# Patient Record
Sex: MALE | Race: WHITE | Employment: FULL TIME | URBAN - METROPOLITAN AREA
[De-identification: names, ages, dates, MRNs, and addresses within clinical notes are randomized per-mention and may not be internally consistent; named-entity substitution may affect disease eponyms.]

---

## 2022-03-01 RX ORDER — M-VIT,TX,IRON,MINS/CALC/FOLIC 27MG-0.4MG
1 TABLET ORAL DAILY
COMMUNITY

## 2022-03-01 RX ORDER — CITALOPRAM 20 MG/1
20 TABLET ORAL DAILY
COMMUNITY
End: 2022-08-12

## 2022-03-01 RX ORDER — OMEPRAZOLE 20 MG/1
40 CAPSULE, DELAYED RELEASE ORAL DAILY
COMMUNITY

## 2022-03-01 RX ORDER — LANOLIN ALCOHOL/MO/W.PET/CERES
400 CREAM (GRAM) TOPICAL DAILY
COMMUNITY

## 2022-03-01 NOTE — PROGRESS NOTES
Obstructive Sleep Apnea (FRANCIS) Screening     Patient:  Randy Bearden    YOB: 1995      Medical Record #:  [id number]                     Date:  3/1/2022     1. Are you a loud and/or regular snorer? []  Yes       [x] No    2. Have you been observed to gasp or stop breathing during sleep? []  Yes       [x] No    3. Do you feel tired or groggy upon awakening or do you awaken with a headache?           []  Yes       [x] No    4. Are you often tired or fatigued during the wake time hours? []  Yes       [x] No    5. Do you fall asleep sitting, reading, watching TV or driving? []  Yes       [] No    6. Do you often have problems with memory or concentration? []  Yes       [] No    **If patient's score is ? 3 they are considered high risk for FRANCIS. An Anesthesia provider will evaluate the patient and develop a plan of care the day of surgery. Note:  If the patient's BMI is more than 35 kg m¯² , has neck circumference > 40 cm, and/or high blood pressure the risk is greater (© American Sleep [employer]nea Association, 2006).

## 2022-03-01 NOTE — PROGRESS NOTES

## 2022-03-04 ENCOUNTER — HOSPITAL ENCOUNTER (OUTPATIENT)
Age: 27
Setting detail: OUTPATIENT SURGERY
Discharge: HOME | End: 2022-03-04
Attending: INTERNAL MEDICINE | Admitting: INTERNAL MEDICINE
Payer: COMMERCIAL

## 2022-03-04 ENCOUNTER — ANESTHESIA (OUTPATIENT)
Dept: ENDOSCOPY | Age: 27
End: 2022-03-04
Payer: COMMERCIAL

## 2022-03-04 ENCOUNTER — ANESTHESIA EVENT (OUTPATIENT)
Dept: ENDOSCOPY | Age: 27
End: 2022-03-04
Payer: COMMERCIAL

## 2022-03-04 VITALS
DIASTOLIC BLOOD PRESSURE: 78 MMHG | SYSTOLIC BLOOD PRESSURE: 116 MMHG | RESPIRATION RATE: 22 BRPM | OXYGEN SATURATION: 96 %

## 2022-03-04 VITALS
HEIGHT: 77 IN | DIASTOLIC BLOOD PRESSURE: 77 MMHG | SYSTOLIC BLOOD PRESSURE: 109 MMHG | TEMPERATURE: 97.1 F | OXYGEN SATURATION: 94 % | RESPIRATION RATE: 16 BRPM | WEIGHT: 300 LBS | BODY MASS INDEX: 35.42 KG/M2 | HEART RATE: 80 BPM

## 2022-03-04 DIAGNOSIS — K27.9 PEPTIC ULCER DISEASE: ICD-10-CM

## 2022-03-04 PROCEDURE — 3700000000 HC ANESTHESIA ATTENDED CARE: Performed by: INTERNAL MEDICINE

## 2022-03-04 PROCEDURE — 3609012400 HC EGD TRANSORAL BIOPSY SINGLE/MULTIPLE: Performed by: INTERNAL MEDICINE

## 2022-03-04 PROCEDURE — 7100000010 HC PHASE II RECOVERY - FIRST 15 MIN: Performed by: INTERNAL MEDICINE

## 2022-03-04 PROCEDURE — 88305 TISSUE EXAM BY PATHOLOGIST: CPT

## 2022-03-04 PROCEDURE — 2580000003 HC RX 258: Performed by: ANESTHESIOLOGY

## 2022-03-04 PROCEDURE — 2500000003 HC RX 250 WO HCPCS: Performed by: NURSE ANESTHETIST, CERTIFIED REGISTERED

## 2022-03-04 PROCEDURE — 3700000001 HC ADD 15 MINUTES (ANESTHESIA): Performed by: INTERNAL MEDICINE

## 2022-03-04 PROCEDURE — 6360000002 HC RX W HCPCS: Performed by: NURSE ANESTHETIST, CERTIFIED REGISTERED

## 2022-03-04 PROCEDURE — 7100000011 HC PHASE II RECOVERY - ADDTL 15 MIN: Performed by: INTERNAL MEDICINE

## 2022-03-04 PROCEDURE — 3609013500 HC EGD REMOVAL TUMOR POLYP/OTHER LESION SNARE TECH: Performed by: INTERNAL MEDICINE

## 2022-03-04 PROCEDURE — 2709999900 HC NON-CHARGEABLE SUPPLY: Performed by: INTERNAL MEDICINE

## 2022-03-04 RX ORDER — PROPOFOL 10 MG/ML
INJECTION, EMULSION INTRAVENOUS PRN
Status: DISCONTINUED | OUTPATIENT
Start: 2022-03-04 | End: 2022-03-04 | Stop reason: SDUPTHER

## 2022-03-04 RX ORDER — SODIUM CHLORIDE 0.9 % (FLUSH) 0.9 %
5-40 SYRINGE (ML) INJECTION PRN
Status: DISCONTINUED | OUTPATIENT
Start: 2022-03-04 | End: 2022-03-04 | Stop reason: HOSPADM

## 2022-03-04 RX ORDER — LIDOCAINE HYDROCHLORIDE 10 MG/ML
0.1 INJECTION, SOLUTION EPIDURAL; INFILTRATION; INTRACAUDAL; PERINEURAL ONCE
Status: DISCONTINUED | OUTPATIENT
Start: 2022-03-04 | End: 2022-03-04 | Stop reason: HOSPADM

## 2022-03-04 RX ORDER — SODIUM CHLORIDE 9 MG/ML
25 INJECTION, SOLUTION INTRAVENOUS PRN
Status: DISCONTINUED | OUTPATIENT
Start: 2022-03-04 | End: 2022-03-04 | Stop reason: HOSPADM

## 2022-03-04 RX ORDER — LIDOCAINE HYDROCHLORIDE 20 MG/ML
INJECTION, SOLUTION INFILTRATION; PERINEURAL PRN
Status: DISCONTINUED | OUTPATIENT
Start: 2022-03-04 | End: 2022-03-04 | Stop reason: SDUPTHER

## 2022-03-04 RX ORDER — SODIUM CHLORIDE 0.9 % (FLUSH) 0.9 %
5-40 SYRINGE (ML) INJECTION EVERY 12 HOURS SCHEDULED
Status: DISCONTINUED | OUTPATIENT
Start: 2022-03-04 | End: 2022-03-04 | Stop reason: HOSPADM

## 2022-03-04 RX ORDER — LIDOCAINE HYDROCHLORIDE 10 MG/ML
0.3 INJECTION, SOLUTION EPIDURAL; INFILTRATION; INTRACAUDAL; PERINEURAL
Status: DISCONTINUED | OUTPATIENT
Start: 2022-03-04 | End: 2022-03-04 | Stop reason: HOSPADM

## 2022-03-04 RX ORDER — GLYCOPYRROLATE 0.2 MG/ML
INJECTION INTRAMUSCULAR; INTRAVENOUS PRN
Status: DISCONTINUED | OUTPATIENT
Start: 2022-03-04 | End: 2022-03-04 | Stop reason: SDUPTHER

## 2022-03-04 RX ORDER — SODIUM CHLORIDE, SODIUM LACTATE, POTASSIUM CHLORIDE, CALCIUM CHLORIDE 600; 310; 30; 20 MG/100ML; MG/100ML; MG/100ML; MG/100ML
INJECTION, SOLUTION INTRAVENOUS CONTINUOUS
Status: DISCONTINUED | OUTPATIENT
Start: 2022-03-04 | End: 2022-03-04 | Stop reason: HOSPADM

## 2022-03-04 RX ADMIN — LIDOCAINE HYDROCHLORIDE 100 MG: 20 INJECTION, SOLUTION INFILTRATION; PERINEURAL at 08:43

## 2022-03-04 RX ADMIN — SODIUM CHLORIDE, POTASSIUM CHLORIDE, SODIUM LACTATE AND CALCIUM CHLORIDE: 600; 310; 30; 20 INJECTION, SOLUTION INTRAVENOUS at 08:04

## 2022-03-04 RX ADMIN — GLYCOPYRROLATE 0.1 MG: 0.2 INJECTION, SOLUTION INTRAMUSCULAR; INTRAVENOUS at 08:43

## 2022-03-04 RX ADMIN — PROPOFOL 400 MG: 10 INJECTION, EMULSION INTRAVENOUS at 08:43

## 2022-03-04 RX ADMIN — PROPOFOL 200 MG: 10 INJECTION, EMULSION INTRAVENOUS at 08:55

## 2022-03-04 ASSESSMENT — PAIN - FUNCTIONAL ASSESSMENT: PAIN_FUNCTIONAL_ASSESSMENT: 0-10

## 2022-03-04 NOTE — H&P
[address]   Pre-operative History and Physical    Patient: Ashly Mckeon  : 1995  Acct#:     HISTORY OF PRESENT ILLNESS:    The patient is a 27 y.o. male who presents for history of attenuated FAP history of duodenal polyps fundic gland polyps and numerous tubular adenomas in the colon. Here for relook EGD had gastric ulcers likely NSAID induced ensure healing as well as repeat biopsies of the small intestine. Indications: Attenuated FAP, history of gastric ulcer    Past Medical History:        Diagnosis Date    Attenuated familial adenomatous polyposis     Mood disorder ([facility] [address]     PUD (peptic ulcer disease)       Past Surgical History:        Procedure Laterality Date    COLONOSCOPY  ,     UPPER GASTROINTESTINAL ENDOSCOPY        Medications Prior to Admission:   No current facility-administered medications on file prior to encounter. Current Outpatient Medications on File Prior to Encounter   Medication Sig Dispense Refill    citalopram (CELEXA) 20 MG tablet Take 20 mg by mouth daily      omeprazole (PRILOSEC) 20 MG delayed release capsule Take 40 mg by mouth daily      Multiple Vitamins-Minerals (THERAPEUTIC MULTIVITAMIN-MINERALS) tablet Take 1 tablet by mouth daily      folic acid (FOLVITE) 839 MCG tablet Take 400 mcg by mouth daily          Allergies:  Patient has no known allergies.     Social History:   Social History     Socioeconomic History    Marital status:      Spouse name: Not on file    Number of children: Not on file    Years of education: Not on file    Highest education level: Not on file   Occupational History    Not on file   Tobacco Use    Smoking status: Never Smoker    Smokeless tobacco: Never Used   Vaping Use    Vaping Use: Never used   Substance and Sexual Activity    Alcohol use: Yes     Comment: socially    Drug use: Never    Sexual activity: Not on file   Other Topics Concern    Not on file   Social History Narrative    Not on file     Social Determinants of Health     Financial Resource Strain:     Difficulty of Paying Living Expenses: Not on file   Food Insecurity:     Worried About Running Out of Food in the Last Year: Not on file    Mariely of Food in the Last Year: Not on file   Transportation Needs:     Lack of Transportation (Medical): Not on file    Lack of Transportation (Non-Medical):  Not on file   Physical Activity:     Days of Exercise per Week: Not on file    Minutes of Exercise per Session: Not on file   Stress:     Feeling of Stress : Not on file   Social Connections:     Frequency of Communication with Friends and Family: Not on file    Frequency of Social Gatherings with Friends and Family: Not on file    Attends Bahai Services: Not on file    Active Member of [address] [place] [address] [employer] or Organizations: Not on file    Attends Club or Organization Meetings: Not on file    Marital Status: Not on file   Intimate Partner Violence:     Fear of Current or Ex-Partner: Not on file    Emotionally Abused: Not on file    Physically Abused: Not on file    Sexually Abused: Not on file   Housing Stability:     Unable to Pay for Housing in the Last Year: Not on file    Number of [place] in the Last Year: Not on file    Unstable Housing in the Last Year: Not on file      Family History:       Problem Relation Age of Onset    Mental Illness Mother     Heart Disease Father     Other Father         attenuated familial adenomatous polyposis        PHYSICAL EXAM:      /87   Pulse 75   Temp 97.1 °F (36.2 °C) (Temporal)   Resp 20   Ht 6' 5\" (1.956 m)   Wt 300 lb (136.1 kg)   SpO2 97%   BMI 35.57 kg/m²  I        Heart:  Normal apical impulse, regular rate and rhythm, normal S1 and S2, no S3 or S4, and no murmur noted    Lungs:  No increased work of breathing, good air exchange, clear to auscultation bilaterally, no crackles or wheezing    Abdomen:  No scars, normal bowel sounds, soft, non-distended, non-tender, no masses palpated, no hepatosplenomegally      ASA Class  ASA 1 - Normal health patient    Mallampati Class: 3      ASSESSMENT AND PLAN:    1. Patient is a suitable candidate for endoscopic procedure and attendant anesthesia  2. Risks, benefits, alternatives of procedure discussed in detail with patient including risks of bleeding, infection, perforation, risks of sedation, risks of missed lesions. The patient wishes to proceed.

## 2022-03-04 NOTE — ANESTHESIA PRE PROCEDURE
Procedure Laterality Date    COLONOSCOPY  2019, 2021    UPPER GASTROINTESTINAL ENDOSCOPY  2021       Social History:    Social History     Tobacco Use    Smoking status: Never Smoker    Smokeless tobacco: Never Used   Substance Use Topics    Alcohol use: Yes     Comment: socially                                Counseling given: Not Answered      Vital Signs (Current):   Vitals:    03/01/22 1041 03/04/22 0757 03/04/22 0803   BP:  126/87    Pulse:   75   Resp:  20    Temp:  97.1 °F (36.2 °C)    TempSrc:  Temporal    SpO2:  97%    Weight: 300 lb (136.1 kg) 300 lb (136.1 kg)    Height: 6' 5\" (1.956 m) 6' 5\" (1.956 m)                                               BP Readings from Last 3 Encounters:   03/04/22 126/87       NPO Status: Time of last liquid consumption: 2200                        Time of last solid consumption: 2200                        Date of last liquid consumption: 03/03/22                        Date of last solid food consumption: 03/03/22    BMI:   Wt Readings from Last 3 Encounters:   03/04/22 300 lb (136.1 kg)     Body mass index is 35.57 kg/m². CBC: No results found for: WBC, RBC, HGB, HCT, MCV, RDW, PLT    CMP: No results found for: NA, K, CL, CO2, BUN, CREATININE, GFRAA, AGRATIO, LABGLOM, GLUCOSE, GLU, PROT, CALCIUM, BILITOT, ALKPHOS, AST, ALT    POC Tests: No results for input(s): POCGLU, POCNA, POCK, POCCL, POCBUN, POCHEMO, POCHCT in the last 72 hours.     Coags: No results found for: PROTIME, INR, APTT    HCG (If Applicable): No results found for: PREGTESTUR, PREGSERUM, HCG, HCGQUANT     ABGs: No results found for: PHART, PO2ART, DIG5VAO, JPC8REO, BEART, K0ESJNRW     Type & Screen (If Applicable):  No results found for: LABABO, LABRH    Drug/Infectious Status (If Applicable):  No results found for: HIV, HEPCAB    COVID-19 Screening (If Applicable):   Lab Results   Component Value Date    COVID19 Not Detected 02/28/2022           Anesthesia Evaluation  Patient summary reviewed and Nursing notes reviewed no history of anesthetic complications:   Airway: Mallampati: III     Neck ROM: full   Dental:          Pulmonary:                              Cardiovascular:                      Neuro/Psych:   (+) psychiatric history:            GI/Hepatic/Renal:   (+) PUD,          ROS comment: obesity. Endo/Other:                     Abdominal:             Vascular: Other Findings:             Anesthesia Plan      MAC     ASA 2       Induction: intravenous. Anesthetic plan and risks discussed with patient. Plan discussed with CRNA.                   Efrain Roberto MD   3/4/2022

## 2022-03-04 NOTE — PROCEDURES
Endoscopy Note    Patient: Duane Taylor  : 1995      Procedure: Esophagogastroduodenoscopy with biopsy, and snare polypectomy    Date:  3/4/2022     Surgeon:  Valerie Peña MD     Referring Physician:  Heydi Mera      History:      INDICATION: History of attenuated FAP, history of gastric ulcer     ASA: 2  SEDATION: MAC         Operative Surgeon: Valerie Peña MD  Scope Type: Gastroscope      Preoperative Diagnosis: Attenuated FAP, history of gastric ulcer  Postoperative Diagnosis: Fundic gland polyps, duodenal polyps, possible ampullary polyp    Procedure Performed: EGD    Procedure Details:    With the patient in left lateral position the endoscope was passed through the hypopharynx into the esophagus. The scope as then passed through the esophagus to the second portion of the duodenum. All visualized portions were carefully inspected. The gastric air was suctioned and the scope as removed. Patient tolerated the procedure well. Findings and maneuvers are discussed below. Complications:  None  Estimated Blood Loss: minimal to none    Post Operative Findings:   Esophagus: Esophageal mucosa was grossly normal throughout the entire esophagus    Stomach: Innumerable fundic gland polyps in the gastric body biopsies were taken of the largest lesions no high risk lesions seen  Otherwise the stomach was normal    Duodenum: In the distal second portion there was a 5 mm flat polyp with denuded villi this was removed with cold snare and collected in a trap, a 2 mm sessile polypoid lesion was removed from the sweep cold snare resection retrieval complete    The ampulla was visualized with forward-viewing with adequate visualization there did appear to be some denuded mucosa on the most proximal side of the ampulla which was biopsied. Ampulla slightly protuberant    Plan: Follow-up biopsies to determine repeat upper endoscopy.   He may benefit from side-viewing and EUS to relook at the ampulla if tubular adenoma is present. Continue acid suppression. Signed By: MD Vlaerie Will MD,   [facility]  [phone]    Please note that some or all of this record was generated using voice recognition software. If there are any questions about the content of this document, please contact the author as some errors in translation may have occurred.

## 2022-03-04 NOTE — ANESTHESIA POSTPROCEDURE EVALUATION
Department of Anesthesiology  Postprocedure Note    Patient: Romeo Dias  MRN: [id number]  YOB: 1995  Date of evaluation: 3/4/2022  Time:  2:11 PM     Procedure Summary     Date: 03/04/22 Room / Location: [address] [place] [address] 01 / [facility]    Anesthesia Start: 1234 Anesthesia Stop: 0906    Procedures:       EGD BIOPSY (N/A )      EGD POLYP SNARE Diagnosis:       Peptic ulcer disease      (Peptic ulcer disease [K27.9])    Surgeons: Keyanna Ruiz MD Responsible Provider: Geno Velasco MD    Anesthesia Type: MAC ASA Status: 2          Anesthesia Type: MAC    Valerio Phase I: Valerio Score: 10    Valerio Phase II: Valerio Score: 9    Last vitals: Reviewed and per EMR flowsheets. Anesthesia Post Evaluation    Comments: Postoperative Anesthesia Note    Name:    Romeo Dias  MRN:      [id number]    Patient Vitals in the past 12 hrs:  03/04/22 0930, BP:109/77, Pulse:80, Resp:16, SpO2:94 %  03/04/22 0927, BP:105/67, Pulse:79, Resp:14, SpO2:95 %  03/04/22 0915, BP:106/69, Pulse:81, Resp:14, SpO2:92 %  03/04/22 0903, BP:104/69, Pulse:92, Resp:14, SpO2:92 %  03/04/22 0803, Pulse:75  03/04/22 0757, BP:126/87, Temp:97.1 °F (36.2 °C), Temp src:Temporal, Resp:20, SpO2:97 %, Height:6' 5\" (1.956 m), Weight:300 lb (136.1 kg)     LABS:    CBC  No results found for: WBC, HGB, HCT, PLT  RENAL  No results found for: NA, K, CL, CO2, BUN, CREATININE, GLUCOSE  COAGS  No results found for: PROTIME, INR, APTT    Intake & Output: In: 440 (P.O.:240; I.V.:200)  Out: -     Nausea & Vomiting:  No    Level of Consciousness:  Awake    Pain Assessment:  Adequate analgesia    Anesthesia Complications:  No apparent anesthetic complications    SUMMARY      Vital signs stable  OK to discharge from Stage I post anesthesia care.   Care transferred from Anesthesiology department on discharge from perioperative area

## 2022-05-12 NOTE — PROGRESS NOTES
[address] time__1300__________        Surgery time____________    Take the following medications with a sip of water: Follow your MD/Surgeons pre-procedure instructions regarding your medications     Do not eat or drink anything after 12:00 midnight prior to your surgery. This includes water chewing gum, mints and ice chips. You may brush your teeth and gargle the morning of your surgery, but do not swallow the water     Please see your family doctor/pediatrician for a history and physical and/or concerning medications. Bring any test results/reports from your physicians office. If you are under the care of a heart doctor or specialist doctor, please be aware that you may be asked to them for clearance    You may be asked to stop blood thinners such as Coumadin, Plavix, Fragmin, Lovenox, etc., or any anti-inflammatories such as:  Aspirin, Ibuprofen, Advil, Naproxen prior to your surgery. We also ask that you stop any OTC medications such as fish oil, vitamin E, glucosamine, garlic, Multivitamins, COQ 10, etc.    We ask that you do not smoke 24 hours prior to surgery  We ask that you do not  drink any alcoholic beverages 24 hours prior to surgery     You must make arrangements for a responsible adult to take you home after your surgery. For your safety you will not be allowed to leave alone or drive yourself home. Your surgery will be cancelled if you do not have a ride home. Also for your safety, it is strongly suggested that someone stay with you the first 24 hours after your surgery. A parent or legal guardian must accompany a child scheduled for surgery and plan to stay at the hospital until the child is discharged. Please do not bring other children with you. For your comfort, please wear simple loose fitting clothing to the hospital.  Please do not bring valuables.     Do not wear any make-up or nail polish on your fingers or toes      For your safety, please do not wear any jewelry or body piercing's on the day of surgery. All jewelry must be removed. If you have dentures, they will be removed before going to operating room. For your convenience, we will provide you with a container. If you wear contact lenses or glasses, they will be removed, please bring a case for them. If you have a living will and a durable power of  for healthcare, please bring in a copy. As part of our patient safety program to minimize surgical site infections, we ask you to do the following:    · Please notify your surgeon if you develop any illness between         now and the  day of your surgery. · This includes a cough, cold, fever, sore throat, nausea,         or vomiting, and diarrhea, etc.  ·  Please notify your surgeon if you experience dizziness, shortness         of breath or blurred vision between now and the time of your surgery. Do not shave your operative site 96 hours prior to surgery. For face and neck surgery, men may use an electric razor 48 hours   prior to surgery. You may shower the night before surgery or the morning of   your surgery with an antibacterial soap. You will need to bring a photo ID and insurance card    [facility] has an onsite pharmacy, would you like to utilize our pharmacy     If you will be staying overnight and use a C-pap machine, please bring   your C-pap to hospital     Our goal is to provide you with excellent care, therefore, visitors will be limited to two(2) in the room at a time so that we may focus on providing this care for you. Please contact pre-admission testing if you have any further questions. [facility] phone number:  [phone] PAT fax number:  [phone]  Please note these are generalized instructions for all surgical cases, you may be provided with more specific instructions according to your surgery.     C-Difficile admission screening and protocol:       * Admitted with diarrhea? [] YES    [x]  NO     *Prior history of C-Diff. In last 3 months? [] YES    [x]  NO     *Antibiotic use in the past 6-8 weeks? [x]  NO    []  YES                 If yes, which ANTIBIOTIC AND REASON______     *Prior hospitalization or nursing home in the last month? []  YES    [x]  NO        SAFETY FIRST. .call before you fall

## 2022-05-19 ENCOUNTER — ANESTHESIA EVENT (OUTPATIENT)
Dept: ENDOSCOPY | Age: 27
End: 2022-05-19
Payer: COMMERCIAL

## 2022-05-20 ENCOUNTER — HOSPITAL ENCOUNTER (OUTPATIENT)
Age: 27
Setting detail: OUTPATIENT SURGERY
Discharge: HOME | End: 2022-05-20
Attending: INTERNAL MEDICINE | Admitting: INTERNAL MEDICINE
Payer: COMMERCIAL

## 2022-05-20 ENCOUNTER — ANESTHESIA (OUTPATIENT)
Dept: ENDOSCOPY | Age: 27
End: 2022-05-20
Payer: COMMERCIAL

## 2022-05-20 VITALS
HEIGHT: 77 IN | SYSTOLIC BLOOD PRESSURE: 108 MMHG | TEMPERATURE: 97.3 F | HEART RATE: 72 BPM | BODY MASS INDEX: 35.42 KG/M2 | OXYGEN SATURATION: 97 % | WEIGHT: 300 LBS | DIASTOLIC BLOOD PRESSURE: 68 MMHG | RESPIRATION RATE: 16 BRPM

## 2022-05-20 DIAGNOSIS — D12.6 ATTENUATED FAMILIAL ADENOMATOUS POLYPOSIS: ICD-10-CM

## 2022-05-20 DIAGNOSIS — Z15.09 ATTENUATED FAMILIAL ADENOMATOUS POLYPOSIS: ICD-10-CM

## 2022-05-20 PROCEDURE — 3700000000 HC ANESTHESIA ATTENDED CARE: Performed by: INTERNAL MEDICINE

## 2022-05-20 PROCEDURE — 7100000011 HC PHASE II RECOVERY - ADDTL 15 MIN: Performed by: INTERNAL MEDICINE

## 2022-05-20 PROCEDURE — 88305 TISSUE EXAM BY PATHOLOGIST: CPT

## 2022-05-20 PROCEDURE — 2500000003 HC RX 250 WO HCPCS: Performed by: NURSE ANESTHETIST, CERTIFIED REGISTERED

## 2022-05-20 PROCEDURE — 7100000001 HC PACU RECOVERY - ADDTL 15 MIN: Performed by: INTERNAL MEDICINE

## 2022-05-20 PROCEDURE — 3609012400 HC EGD TRANSORAL BIOPSY SINGLE/MULTIPLE: Performed by: INTERNAL MEDICINE

## 2022-05-20 PROCEDURE — 7100000000 HC PACU RECOVERY - FIRST 15 MIN: Performed by: INTERNAL MEDICINE

## 2022-05-20 PROCEDURE — 2709999900 HC NON-CHARGEABLE SUPPLY: Performed by: INTERNAL MEDICINE

## 2022-05-20 PROCEDURE — 7100000010 HC PHASE II RECOVERY - FIRST 15 MIN: Performed by: INTERNAL MEDICINE

## 2022-05-20 PROCEDURE — 6360000002 HC RX W HCPCS: Performed by: NURSE ANESTHETIST, CERTIFIED REGISTERED

## 2022-05-20 PROCEDURE — 3700000001 HC ADD 15 MINUTES (ANESTHESIA): Performed by: INTERNAL MEDICINE

## 2022-05-20 PROCEDURE — 2580000003 HC RX 258: Performed by: ANESTHESIOLOGY

## 2022-05-20 PROCEDURE — 3609018500 HC EGD US SCOPE W/ADJACENT STRUCTURES: Performed by: INTERNAL MEDICINE

## 2022-05-20 RX ORDER — SODIUM CHLORIDE 0.9 % (FLUSH) 0.9 %
5-40 SYRINGE (ML) INJECTION PRN
Status: DISCONTINUED | OUTPATIENT
Start: 2022-05-20 | End: 2022-05-20 | Stop reason: HOSPADM

## 2022-05-20 RX ORDER — PROPOFOL 10 MG/ML
INJECTION, EMULSION INTRAVENOUS CONTINUOUS PRN
Status: DISCONTINUED | OUTPATIENT
Start: 2022-05-20 | End: 2022-05-20 | Stop reason: SDUPTHER

## 2022-05-20 RX ORDER — OXYCODONE HYDROCHLORIDE 5 MG/1
5 TABLET ORAL
Status: DISCONTINUED | OUTPATIENT
Start: 2022-05-20 | End: 2022-05-20 | Stop reason: HOSPADM

## 2022-05-20 RX ORDER — SODIUM CHLORIDE 9 MG/ML
INJECTION, SOLUTION INTRAVENOUS CONTINUOUS
Status: DISCONTINUED | OUTPATIENT
Start: 2022-05-20 | End: 2022-05-20 | Stop reason: HOSPADM

## 2022-05-20 RX ORDER — PROPOFOL 10 MG/ML
INJECTION, EMULSION INTRAVENOUS PRN
Status: DISCONTINUED | OUTPATIENT
Start: 2022-05-20 | End: 2022-05-20 | Stop reason: SDUPTHER

## 2022-05-20 RX ORDER — MEPERIDINE HYDROCHLORIDE 25 MG/ML
12.5 INJECTION INTRAMUSCULAR; INTRAVENOUS; SUBCUTANEOUS ONCE
Status: DISCONTINUED | OUTPATIENT
Start: 2022-05-20 | End: 2022-05-20 | Stop reason: HOSPADM

## 2022-05-20 RX ORDER — ONDANSETRON 2 MG/ML
4 INJECTION INTRAMUSCULAR; INTRAVENOUS
Status: DISCONTINUED | OUTPATIENT
Start: 2022-05-20 | End: 2022-05-20 | Stop reason: HOSPADM

## 2022-05-20 RX ORDER — SODIUM CHLORIDE 9 MG/ML
INJECTION, SOLUTION INTRAVENOUS PRN
Status: DISCONTINUED | OUTPATIENT
Start: 2022-05-20 | End: 2022-05-20 | Stop reason: HOSPADM

## 2022-05-20 RX ORDER — SODIUM CHLORIDE 0.9 % (FLUSH) 0.9 %
5-40 SYRINGE (ML) INJECTION EVERY 12 HOURS SCHEDULED
Status: DISCONTINUED | OUTPATIENT
Start: 2022-05-20 | End: 2022-05-20 | Stop reason: HOSPADM

## 2022-05-20 RX ORDER — DIPHENHYDRAMINE HYDROCHLORIDE 50 MG/ML
12.5 INJECTION INTRAMUSCULAR; INTRAVENOUS
Status: DISCONTINUED | OUTPATIENT
Start: 2022-05-20 | End: 2022-05-20 | Stop reason: HOSPADM

## 2022-05-20 RX ORDER — HALOPERIDOL 5 MG/ML
1 INJECTION INTRAMUSCULAR
Status: DISCONTINUED | OUTPATIENT
Start: 2022-05-20 | End: 2022-05-20 | Stop reason: HOSPADM

## 2022-05-20 RX ORDER — LIDOCAINE HYDROCHLORIDE 20 MG/ML
INJECTION, SOLUTION EPIDURAL; INFILTRATION; INTRACAUDAL; PERINEURAL PRN
Status: DISCONTINUED | OUTPATIENT
Start: 2022-05-20 | End: 2022-05-20 | Stop reason: SDUPTHER

## 2022-05-20 RX ORDER — FENTANYL CITRATE 50 UG/ML
50 INJECTION, SOLUTION INTRAMUSCULAR; INTRAVENOUS EVERY 5 MIN PRN
Status: DISCONTINUED | OUTPATIENT
Start: 2022-05-20 | End: 2022-05-20 | Stop reason: HOSPADM

## 2022-05-20 RX ORDER — LORAZEPAM 2 MG/ML
0.5 INJECTION INTRAMUSCULAR
Status: DISCONTINUED | OUTPATIENT
Start: 2022-05-20 | End: 2022-05-20 | Stop reason: HOSPADM

## 2022-05-20 RX ADMIN — PROPOFOL 50 MG: 10 INJECTION, EMULSION INTRAVENOUS at 14:36

## 2022-05-20 RX ADMIN — SODIUM CHLORIDE: 9 INJECTION, SOLUTION INTRAVENOUS at 13:20

## 2022-05-20 RX ADMIN — PROPOFOL 100 MG: 10 INJECTION, EMULSION INTRAVENOUS at 14:17

## 2022-05-20 RX ADMIN — PROPOFOL 100 MG: 10 INJECTION, EMULSION INTRAVENOUS at 14:19

## 2022-05-20 RX ADMIN — PROPOFOL 200 MCG/KG/MIN: 10 INJECTION, EMULSION INTRAVENOUS at 14:16

## 2022-05-20 RX ADMIN — PROPOFOL 100 MG: 10 INJECTION, EMULSION INTRAVENOUS at 14:26

## 2022-05-20 RX ADMIN — LIDOCAINE HYDROCHLORIDE 80 MG: 20 INJECTION, SOLUTION EPIDURAL; INFILTRATION; INTRACAUDAL; PERINEURAL at 14:16

## 2022-05-20 RX ADMIN — PROPOFOL 200 MG: 10 INJECTION, EMULSION INTRAVENOUS at 14:16

## 2022-05-20 RX ADMIN — PROPOFOL 50 MG: 10 INJECTION, EMULSION INTRAVENOUS at 14:45

## 2022-05-20 RX ADMIN — PROPOFOL 50 MG: 10 INJECTION, EMULSION INTRAVENOUS at 14:40

## 2022-05-20 RX ADMIN — PROPOFOL 50 MG: 10 INJECTION, EMULSION INTRAVENOUS at 14:31

## 2022-05-20 ASSESSMENT — LIFESTYLE VARIABLES: SMOKING_STATUS: 0

## 2022-05-20 ASSESSMENT — PAIN - FUNCTIONAL ASSESSMENT: PAIN_FUNCTIONAL_ASSESSMENT: NONE - DENIES PAIN

## 2022-05-20 NOTE — OP NOTE
[facility]  [facility]    Patient: Mahamed Patterson   : 1995  Acct#:     Procedure: Esophagogastroduodenoscopy with Endoscopic Ultrasound    Date: 2022    Surgeon:  Therese Ravi MD, MD    Anesthesia:  Monitored anesthesia care per Anesthesia. 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 3/22    Impression:  EGD:  1) gastric fundic gland polyposis  2) small duodenal polyps x 3, biopsied  3) ampullary adenoma, biopsied via ERCP scope, about 10 mm in size    EUS:  1) limited EUS due to body habitus and labored breathing  2) ampullary adenoma corresponds to a 10 x 8 mm hypoechoic lesion in duodenal wall  3) no clear invasion into bile duct or pancreatic duct, both are nondilated  4) nonspecific pancreatic changes which could be seen with early chronic pancreatitis  5) fatty liver    Consent: Informed consent was obtained from the patient after explanation of indications, benefits, possible risks and complications of the procedure. Specifically the risk of bleeding, infection, perforation, pancreatitis, anesthesia complications, and remote possibility of mortality among others were explained. Monitoring: Patient was monitored with continuous pulse oximetry, telemetry, and intermittent blood pressures. Procedure:   A time-out was performed. The patient and staff were in agreement as to the correct patient and procedure. The above anesthesia was administered by the anesthesia department. The patient was placed in the left lateral prone position. Next, the gastroscope then the duodenoscope then the linear echoendoscope was advanced without difficulty to the 2nd portion of the duodenum. By EGD esophagus was normal. Z line was regular. Multiple fundic gland polyps were seen carpeting the proximal stomach. Recent biopsies were done so repeat biopsies not repeated. Stomach otherwise normal including retroflex view of the proximal stomach.  Duodenum to the second portion had three small whitish polyps/plaques that were biopsied for histologic sampling (one in sweep and two in descending duodenum). No large polyps. EGD scope was removed and sideviewer was advanced to the descending duodenum. There was a prominent ampulla measuring about 10 mm in size. Surface was likely adenomatous. Multiple biopsies obtained from superior portion of the ampulla taking care to avoid the ampullary orifice. The ERCP scope removed and EUS was performed. Findings:   Celiac axis and SMA: no lymphadenopathy. Left adrenal gland normal.    Pancreas: The pancreatic duct measured 3 mm in head, 2 mm body. Some echogenic duct walls. Pancreatic echogenic stranding and foci in head and uncinate process. There was a hypoechoic 10 x 8 mm in duodenal wall likely representing the ampullary adenoma without clear muscular or ductal invasion. Bile duct: The extrahepatic bile duct measured 3 mm. No choledocholithiasis. No masses in the examined liver. Galllbladder showed no stones. Fatty liver was seen. Mediastinum: no significant subcarinal or aortopulmonary window lymphadenopathy. The duodenum and stomach were decompressed and the scope was withdrawn from the patient. All instruments were removed. The patient tolerated the procedure well and was taken to the post anesthesia care unit in good condition. Estimated blood loss: minimal (< 50 mL)      Impression:  EGD:  1) gastric fundic gland polyposis  2) small duodenal polyps x 3, biopsied  3) ampullary adenoma, biopsied via ERCP scope, about 10 mm in size    EUS:  1) limited EUS due to body habitus and labored breathing  2) ampullary adenoma corresponds to a 10 x 8 mm hypoechoic lesion in duodenal wall  3) no clear invasion into bile duct or pancreatic duct, both are nondilated  4) nonspecific pancreatic changes which could be seen with early chronic pancreatitis  5) fatty liver    Recommendations:  1.    EGD for surveillance in one year with EGD and sideviewing scope  2. If growth of ampullary adenoma would consider ampullectomy  3.    Await pathology    Fausto Lira MD  [address]  5/20/2022  [phone]

## 2022-05-20 NOTE — PROGRESS NOTES
Medications administered and monitored by CRNA, see anesthesia record.     Electronically signed by Foster Rosa RN on 5/20/2022 at 2:14 PM

## 2022-05-20 NOTE — PROGRESS NOTES
Patient to pacu from endo, vss  On 2lnc, patient resting comfortably. Will continue to monitor. 1500: Patient vss on room air, patient denies any pain or nausea at this time.

## 2022-05-20 NOTE — ANESTHESIA PRE PROCEDURE
Department of Anesthesiology  Preprocedure Note       Name:  Fernando Golden   Age:  32 y.o.  :  1995                                          MRN:  [id number]         Date:  2022      Surgeon: Augustin Berkowitz):  Mily Montemayor MD    Procedure: Procedure(s):  ENDOSCOPIC ULTRASOUND OF UPPER GASTROINTESTINAL TRACT    Medications prior to admission:   Prior to Admission medications    Medication Sig Start Date End Date Taking? Authorizing Provider   citalopram (CELEXA) 20 MG tablet Take 20 mg by mouth daily    Historical Provider, MD   omeprazole (PRILOSEC) 20 MG delayed release capsule Take 40 mg by mouth daily    Historical Provider, MD   Multiple Vitamins-Minerals (THERAPEUTIC MULTIVITAMIN-MINERALS) tablet Take 1 tablet by mouth daily    Historical Provider, MD   folic acid (FOLVITE) 704 MCG tablet Take 400 mcg by mouth daily    Historical Provider, MD       Current medications:    Current Facility-Administered Medications   Medication Dose Route Frequency Provider Last Rate Last Admin    0.9 % sodium chloride infusion   IntraVENous Continuous Gail Kat  mL/hr at 22 1336 NoRateChange at 22 1336    sodium chloride flush 0.9 % injection 5-40 mL  5-40 mL IntraVENous 2 times per day Gail Kat MD        sodium chloride flush 0.9 % injection 5-40 mL  5-40 mL IntraVENous PRN Gail Kat MD        0.9 % sodium chloride infusion   IntraVENous PRN Gail Kat MD           Allergies:  No Known Allergies    Problem List:  There is no problem list on file for this patient.       Past Medical History:        Diagnosis Date    Attenuated familial adenomatous polyposis     Mood disorder ([facility] [address]     PUD (peptic ulcer disease)        Past Surgical History:        Procedure Laterality Date    COLONOSCOPY  2021    UPPER GASTROINTESTINAL ENDOSCOPY      UPPER GASTROINTESTINAL ENDOSCOPY N/A 3/4/2022    EGD BIOPSY performed by Lotus Lou MD at [address] [facility] [place] GASTROINTESTINAL ENDOSCOPY  3/4/2022    EGD POLYP SNARE performed by Gaston Goff MD at [address] [place] [address] History:    Social History     Tobacco Use    Smoking status: Never Smoker    Smokeless tobacco: Never Used   Substance Use Topics    Alcohol use: Yes     Comment: socially                                Counseling given: Not Answered      Vital Signs (Current):   Vitals:    05/12/22 1304 05/20/22 1315   BP:  133/83   Pulse:  75   Resp:  18   Temp:  96.9 °F (36.1 °C)   TempSrc:  Temporal   SpO2:  97%   Weight: 300 lb (136.1 kg)    Height: 6' 5\" (1.956 m)                                               BP Readings from Last 3 Encounters:   05/20/22 133/83   03/04/22 109/77   03/04/22 116/78       NPO Status: Time of last liquid consumption: 2100                        Time of last solid consumption: 2100                        Date of last liquid consumption: 05/19/22                        Date of last solid food consumption: 05/19/22    BMI:   Wt Readings from Last 3 Encounters:   05/12/22 300 lb (136.1 kg)   03/04/22 300 lb (136.1 kg)     Body mass index is 35.57 kg/m². CBC: No results found for: WBC, RBC, HGB, HCT, MCV, RDW, PLT    CMP: No results found for: NA, K, CL, CO2, BUN, CREATININE, GFRAA, AGRATIO, LABGLOM, GLUCOSE, GLU, PROT, CALCIUM, BILITOT, ALKPHOS, AST, ALT    POC Tests: No results for input(s): POCGLU, POCNA, POCK, POCCL, POCBUN, POCHEMO, POCHCT in the last 72 hours.     Coags: No results found for: PROTIME, INR, APTT    HCG (If Applicable): No results found for: PREGTESTUR, PREGSERUM, HCG, HCGQUANT     ABGs: No results found for: PHART, PO2ART, BDX2NYL, ZKQ7KWH, BEART, U0SWGDIV     Type & Screen (If Applicable):  No results found for: LABABO, LABRH    Drug/Infectious Status (If Applicable):  No results found for: HIV, HEPCAB    COVID-19 Screening (If Applicable):   Lab Results   Component Value Date    COVID19 Not Detected 02/28/2022           Anesthesia Evaluation  Patient summary reviewed no history of anesthetic complications:   Airway: Mallampati: I  TM distance: >3 FB   Neck ROM: full  Mouth opening: > = 3 FB Dental: normal exam         Pulmonary:       (-) sleep apnea and not a current smoker                           Cardiovascular:        (-) past MI, CABG/stent and  angina                Neuro/Psych:   (+) psychiatric history:   (-) seizures           GI/Hepatic/Renal:   (+) PUD,           Endo/Other:                     Abdominal:   (+) obese,           Vascular: Other Findings:             Anesthesia Plan      MAC     ASA 2       Induction: intravenous. Anesthetic plan and risks discussed with patient. Plan discussed with CRNA. This pre-anesthesia assessment may be used as a history and physical.    DOS STAFF ADDENDUM:    Pt seen and examined, chart reviewed (including anesthesia, drug and allergy history). No interval changes to history and physical examination. Anesthetic plan, risks, benefits, alternatives, and personnel involved discussed with patient. Patient verbalized an understanding and agrees to proceed.       Zaid Cleveland MD  May 20, 2022  1:44 PM

## 2022-05-20 NOTE — H&P
[facility]   Pre-operative History and Physical    Patient: Raul Jesus  : 1995  Acct#:     HISTORY OF PRESENT ILLNESS:    The patient is a 32 y.o. 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 Current Outpatient Medications on File Prior to Encounter   Medication Sig Dispense Refill    citalopram (CELEXA) 20 MG tablet Take 20 mg by mouth daily      omeprazole (PRILOSEC) 20 MG delayed release capsule Take 40 mg by mouth daily      Multiple Vitamins-Minerals (THERAPEUTIC MULTIVITAMIN-MINERALS) tablet Take 1 tablet by mouth daily      folic acid (FOLVITE) 744 MCG tablet Take 400 mcg by mouth daily          Allergies:  Patient has no known allergies.     Social History:   Social History     Socioeconomic History    Marital status:      Spouse name: Not on file    Number of children: Not on file    Years of education: Not on file    Highest education level: Not on file   Occupational History    Not on file   Tobacco Use    Smoking status: Never Smoker    Smokeless tobacco: Never Used   Vaping Use    Vaping Use: Never used   Substance and Sexual Activity    Alcohol use: Yes     Comment: socially    Drug use: Never    Sexual activity: Yes   Other Topics Concern    Not on file   Social History Narrative    Not on file     Social Determinants of Health     Financial Resource Strain:     Difficulty of Paying Living Expenses: Not on file   Food Insecurity:     Worried About Running Out of Food in the Last Year: Not on file    Mariely of Food in the Last Year: Not on file   Transportation Needs:     Lack of Transportation (Medical): Not on file    Lack of Transportation (Non-Medical):  Not on file   Physical Activity:     Days of Exercise per Week: Not on file    Minutes of Exercise per Session: Not on file   Stress:     Feeling of Stress : Not on file   Social Connections:     Frequency of Communication with Friends and Family: Not on file    Frequency of Social Gatherings with Friends and Family: Not on file    Attends Mosque Services: Not on file    Active Member of [address] [place] [address] [employer] or Organizations: Not on file    Attends Club or Organization Meetings: Not on file    Marital Status: Not on file   Intimate Partner Violence:     Fear of Current or Ex-Partner: Not on file    Emotionally Abused: Not on file    Physically Abused: Not on file    Sexually Abused: Not on file   Housing Stability:     Unable to Pay for Housing in the Last Year: Not on file    Number of [place] in the Last Year: Not on file    Unstable Housing in the Last Year: Not on file      Family History:       Problem Relation Age of Onset    Mental Illness Mother     Heart Disease Father     Other Father         attenuated familial adenomatous polyposis        PHYSICAL EXAM:      /83   Pulse 75   Temp 96.9 °F (36.1 °C) (Temporal)   Resp 18   Ht 6' 5\" (1.956 m)   Wt 300 lb (136.1 kg)   SpO2 97%   BMI 35.57 kg/m²  I        Heart:  Normal apical impulse, regular rate and rhythm, normal S1 and S2, no S3 or S4, and no murmur noted    Lungs:  No increased work of breathing, good air exchange, clear to auscultation bilaterally, no crackles or wheezing    Abdomen:  No scars, normal bowel sounds, soft, non-distended, non-tender, no masses palpated, no hepatosplenomegally      ASA Class  ASA 2 - Patient with mild systemic disease with no functional limitations    Mallampati Class: II      ASSESSMENT AND PLAN:    1. Patient is a suitable candidate for endoscopic procedure and attendant anesthesia  2. Risks, benefits, alternatives of procedure discussed in detail with patient including risks of bleeding, infection, perforation, risks of sedation, risks of missed lesions. The patient wishes to proceed.

## 2022-05-20 NOTE — ANESTHESIA POSTPROCEDURE EVALUATION
Department of Anesthesiology  Postprocedure Note    Patient: Moncho Velasco  MRN: [id number]  YOB: 1995  Date of evaluation: 5/20/2022  Time:  3:13 PM     Procedure Summary     Date: 05/20/22 Room / Location: [address] [place] [address]    Anesthesia Start: 9207 Anesthesia Stop: 1446    Procedures:       ENDOSCOPIC ULTRASOUND OF UPPER GASTROINTESTINAL TRACT (N/A )      EGD BIOPSY Diagnosis:       Attenuated familial adenomatous polyposis      (Attenuated familial adenomatous polyposis)    Surgeons: Toyin Blanco MD Responsible Provider: Marlon Calderón MD    Anesthesia Type: MAC ASA Status: 2          Anesthesia Type: No value filed. Valerio Phase I: Valerio Score: 8    Valerio Phase II:      Last vitals: Reviewed and per EMR flowsheets.        Anesthesia Post Evaluation    Patient location during evaluation: bedside  Patient participation: complete - patient participated  Level of consciousness: awake and alert  Pain score: 0  Nausea & Vomiting: no nausea  Complications: no  Cardiovascular status: hemodynamically stable  Respiratory status: acceptable  Hydration status: stable

## 2022-08-12 NOTE — PROGRESS NOTES

## 2022-08-19 ENCOUNTER — ANESTHESIA (OUTPATIENT)
Dept: ENDOSCOPY | Age: 27
End: 2022-08-19
Payer: COMMERCIAL

## 2022-08-19 ENCOUNTER — ANESTHESIA EVENT (OUTPATIENT)
Dept: ENDOSCOPY | Age: 27
End: 2022-08-19
Payer: COMMERCIAL

## 2022-08-19 ENCOUNTER — HOSPITAL ENCOUNTER (OUTPATIENT)
Age: 27
Setting detail: OUTPATIENT SURGERY
Discharge: HOME | End: 2022-08-19
Attending: INTERNAL MEDICINE | Admitting: INTERNAL MEDICINE
Payer: COMMERCIAL

## 2022-08-19 VITALS
SYSTOLIC BLOOD PRESSURE: 110 MMHG | HEIGHT: 77 IN | RESPIRATION RATE: 16 BRPM | BODY MASS INDEX: 35.42 KG/M2 | OXYGEN SATURATION: 99 % | DIASTOLIC BLOOD PRESSURE: 85 MMHG | WEIGHT: 300 LBS | HEART RATE: 79 BPM | TEMPERATURE: 96.8 F

## 2022-08-19 DIAGNOSIS — Z86.010 HISTORY OF COLON POLYPS: ICD-10-CM

## 2022-08-19 PROCEDURE — 3700000001 HC ADD 15 MINUTES (ANESTHESIA): Performed by: INTERNAL MEDICINE

## 2022-08-19 PROCEDURE — 2580000003 HC RX 258: Performed by: INTERNAL MEDICINE

## 2022-08-19 PROCEDURE — 88305 TISSUE EXAM BY PATHOLOGIST: CPT

## 2022-08-19 PROCEDURE — 7100000010 HC PHASE II RECOVERY - FIRST 15 MIN: Performed by: INTERNAL MEDICINE

## 2022-08-19 PROCEDURE — 2500000003 HC RX 250 WO HCPCS: Performed by: INTERNAL MEDICINE

## 2022-08-19 PROCEDURE — 3609010600 HC COLONOSCOPY POLYPECTOMY SNARE/COLD BIOPSY: Performed by: INTERNAL MEDICINE

## 2022-08-19 PROCEDURE — 6360000002 HC RX W HCPCS: Performed by: NURSE ANESTHETIST, CERTIFIED REGISTERED

## 2022-08-19 PROCEDURE — 7100000011 HC PHASE II RECOVERY - ADDTL 15 MIN: Performed by: INTERNAL MEDICINE

## 2022-08-19 PROCEDURE — 3700000000 HC ANESTHESIA ATTENDED CARE: Performed by: INTERNAL MEDICINE

## 2022-08-19 PROCEDURE — 2709999900 HC NON-CHARGEABLE SUPPLY: Performed by: INTERNAL MEDICINE

## 2022-08-19 RX ORDER — OXYCODONE HYDROCHLORIDE 5 MG/1
10 TABLET ORAL PRN
Status: DISCONTINUED | OUTPATIENT
Start: 2022-08-19 | End: 2022-08-19 | Stop reason: HOSPADM

## 2022-08-19 RX ORDER — OXYCODONE HYDROCHLORIDE 5 MG/1
5 TABLET ORAL PRN
Status: DISCONTINUED | OUTPATIENT
Start: 2022-08-19 | End: 2022-08-19 | Stop reason: HOSPADM

## 2022-08-19 RX ORDER — LABETALOL HYDROCHLORIDE 5 MG/ML
5 INJECTION, SOLUTION INTRAVENOUS EVERY 10 MIN PRN
Status: DISCONTINUED | OUTPATIENT
Start: 2022-08-19 | End: 2022-08-19 | Stop reason: HOSPADM

## 2022-08-19 RX ORDER — DIPHENHYDRAMINE HYDROCHLORIDE 50 MG/ML
12.5 INJECTION INTRAMUSCULAR; INTRAVENOUS
Status: DISCONTINUED | OUTPATIENT
Start: 2022-08-19 | End: 2022-08-19 | Stop reason: HOSPADM

## 2022-08-19 RX ORDER — SODIUM CHLORIDE 9 MG/ML
INJECTION, SOLUTION INTRAVENOUS PRN
Status: DISCONTINUED | OUTPATIENT
Start: 2022-08-19 | End: 2022-08-19 | Stop reason: HOSPADM

## 2022-08-19 RX ORDER — LIDOCAINE HYDROCHLORIDE 10 MG/ML
0.1 INJECTION, SOLUTION EPIDURAL; INFILTRATION; INTRACAUDAL; PERINEURAL ONCE
Status: COMPLETED | OUTPATIENT
Start: 2022-08-19 | End: 2022-08-19

## 2022-08-19 RX ORDER — PROPOFOL 10 MG/ML
INJECTION, EMULSION INTRAVENOUS PRN
Status: DISCONTINUED | OUTPATIENT
Start: 2022-08-19 | End: 2022-08-19 | Stop reason: SDUPTHER

## 2022-08-19 RX ORDER — SODIUM CHLORIDE, SODIUM LACTATE, POTASSIUM CHLORIDE, CALCIUM CHLORIDE 600; 310; 30; 20 MG/100ML; MG/100ML; MG/100ML; MG/100ML
INJECTION, SOLUTION INTRAVENOUS CONTINUOUS
Status: DISCONTINUED | OUTPATIENT
Start: 2022-08-19 | End: 2022-08-19 | Stop reason: HOSPADM

## 2022-08-19 RX ORDER — ONDANSETRON 2 MG/ML
4 INJECTION INTRAMUSCULAR; INTRAVENOUS
Status: DISCONTINUED | OUTPATIENT
Start: 2022-08-19 | End: 2022-08-19 | Stop reason: HOSPADM

## 2022-08-19 RX ORDER — SODIUM CHLORIDE 0.9 % (FLUSH) 0.9 %
5-40 SYRINGE (ML) INJECTION PRN
Status: DISCONTINUED | OUTPATIENT
Start: 2022-08-19 | End: 2022-08-19 | Stop reason: HOSPADM

## 2022-08-19 RX ORDER — MEPERIDINE HYDROCHLORIDE 50 MG/ML
12.5 INJECTION INTRAMUSCULAR; INTRAVENOUS; SUBCUTANEOUS EVERY 5 MIN PRN
Status: DISCONTINUED | OUTPATIENT
Start: 2022-08-19 | End: 2022-08-19 | Stop reason: HOSPADM

## 2022-08-19 RX ORDER — SODIUM CHLORIDE 0.9 % (FLUSH) 0.9 %
5-40 SYRINGE (ML) INJECTION EVERY 12 HOURS SCHEDULED
Status: DISCONTINUED | OUTPATIENT
Start: 2022-08-19 | End: 2022-08-19 | Stop reason: HOSPADM

## 2022-08-19 RX ADMIN — PROPOFOL 20 MG: 10 INJECTION, EMULSION INTRAVENOUS at 07:58

## 2022-08-19 RX ADMIN — PROPOFOL 20 MG: 10 INJECTION, EMULSION INTRAVENOUS at 07:52

## 2022-08-19 RX ADMIN — PROPOFOL 20 MG: 10 INJECTION, EMULSION INTRAVENOUS at 08:06

## 2022-08-19 RX ADMIN — PROPOFOL 20 MG: 10 INJECTION, EMULSION INTRAVENOUS at 07:42

## 2022-08-19 RX ADMIN — PROPOFOL 20 MG: 10 INJECTION, EMULSION INTRAVENOUS at 07:48

## 2022-08-19 RX ADMIN — PROPOFOL 20 MG: 10 INJECTION, EMULSION INTRAVENOUS at 08:00

## 2022-08-19 RX ADMIN — PROPOFOL 20 MG: 10 INJECTION, EMULSION INTRAVENOUS at 07:44

## 2022-08-19 RX ADMIN — PROPOFOL 20 MG: 10 INJECTION, EMULSION INTRAVENOUS at 07:56

## 2022-08-19 RX ADMIN — PROPOFOL 20 MG: 10 INJECTION, EMULSION INTRAVENOUS at 07:54

## 2022-08-19 RX ADMIN — PROPOFOL 20 MG: 10 INJECTION, EMULSION INTRAVENOUS at 07:50

## 2022-08-19 RX ADMIN — PROPOFOL 20 MG: 10 INJECTION, EMULSION INTRAVENOUS at 07:46

## 2022-08-19 RX ADMIN — PROPOFOL 20 MG: 10 INJECTION, EMULSION INTRAVENOUS at 08:04

## 2022-08-19 RX ADMIN — LIDOCAINE HYDROCHLORIDE 3 ML: 10 INJECTION, SOLUTION EPIDURAL; INFILTRATION; INTRACAUDAL; PERINEURAL at 07:38

## 2022-08-19 RX ADMIN — SODIUM CHLORIDE, POTASSIUM CHLORIDE, SODIUM LACTATE AND CALCIUM CHLORIDE: 600; 310; 30; 20 INJECTION, SOLUTION INTRAVENOUS at 07:02

## 2022-08-19 RX ADMIN — PROPOFOL 20 MG: 10 INJECTION, EMULSION INTRAVENOUS at 08:02

## 2022-08-19 RX ADMIN — PROPOFOL 20 MG: 10 INJECTION, EMULSION INTRAVENOUS at 07:40

## 2022-08-19 RX ADMIN — PROPOFOL 130 MG: 10 INJECTION, EMULSION INTRAVENOUS at 07:38

## 2022-08-19 ASSESSMENT — PAIN SCALES - GENERAL
PAINLEVEL_OUTOF10: 0

## 2022-08-19 ASSESSMENT — PAIN - FUNCTIONAL ASSESSMENT: PAIN_FUNCTIONAL_ASSESSMENT: 0-10

## 2022-08-19 NOTE — ANESTHESIA POSTPROCEDURE EVALUATION
Department of Anesthesiology  Postprocedure Note    Patient: Pat Simpson  MRN: [id number]  YOB: 1995  Date of evaluation: 8/19/2022      Procedure Summary     Date: 08/19/22 Room / Location: [address]    Anesthesia Start: 2596 Anesthesia Stop: 7297    Procedure: COLONOSCOPY Diagnosis:       History of colon polyps      (History of colon polyps [Z86.010])    Surgeons: Padma Garrido MD Responsible Provider: Cyndee Willard MD    Anesthesia Type: MAC ASA Status: 2          Anesthesia Type: No value filed. Valerio Phase I: Valerio Score: 10    Valerio Phase II: Valerio Score: 10      Anesthesia Post Evaluation    Comments: Postoperative Anesthesia Note    Name:    Pat Simpson  MRN:      [id number]    Patient Vitals in the past 12 hrs:  08/19/22 0842, BP:110/85, Pulse:79, Resp:16, SpO2:99 %  08/19/22 0827, BP:116/80, Pulse:73, Resp:16, SpO2:96 %  08/19/22 0812, BP:115/84, Pulse:92, Resp:16, SpO2:94 %  08/19/22 0659, BP:121/88, Temp:96.8 °F (36 °C), Temp src:Temporal, Pulse:78, Resp:18, SpO2:95 %, Height:6' 5\" (1.956 m), Weight:300 lb (136.1 kg)     LABS:    CBC  No results found for: WBC, HGB, HCT, PLT  RENAL  No results found for: NA, K, CL, CO2, BUN, CREATININE, GLUCOSE  COAGS  No results found for: PROTIME, INR, APTT    Intake & Output:  @25BACJ@    Nausea & Vomiting:  No    Level of Consciousness:  Awake    Pain Assessment:  Adequate analgesia    Anesthesia Complications:  No apparent anesthetic complications    SUMMARY      Vital signs stable  OK to discharge from Stage I post anesthesia care.   Care transferred from Anesthesiology department on discharge from perioperative area

## 2022-08-19 NOTE — ANESTHESIA PRE PROCEDURE
Department of Anesthesiology  Preprocedure Note       Name:  [facility]   Age:  32 y.o.  :  1995                                          MRN:  [id number]         Date:  2022      Surgeon: Aldo Samano):  Erica Moe MD    Procedure: Procedure(s):  COLONOSCOPY    Medications prior to admission:   Prior to Admission medications    Medication Sig Start Date End Date Taking? Authorizing Provider   omeprazole (PRILOSEC) 20 MG delayed release capsule Take 40 mg by mouth daily    Historical Provider, MD   Multiple Vitamins-Minerals (THERAPEUTIC MULTIVITAMIN-MINERALS) tablet Take 1 tablet by mouth daily    Historical Provider, MD   folic acid (FOLVITE) 727 MCG tablet Take 400 mcg by mouth daily    Historical Provider, MD       Current medications:    Current Facility-Administered Medications   Medication Dose Route Frequency Provider Last Rate Last Admin    lactated ringers infusion   IntraVENous Continuous Erica Moe MD        lidocaine PF 1 % injection 0.1 mL  0.1 mL IntraDERmal Once Erica Moe MD           Allergies:  No Known Allergies    Problem List:  There is no problem list on file for this patient.       Past Medical History:        Diagnosis Date    Attenuated familial adenomatous polyposis     Mood disorder ([facility] [address]     PUD (peptic ulcer disease)        Past Surgical History:        Procedure Laterality Date    COLONOSCOPY  2021    UPPER GASTROINTESTINAL ENDOSCOPY      UPPER GASTROINTESTINAL ENDOSCOPY N/A 3/4/2022    EGD BIOPSY performed by Erica Moe MD at [facility]  3/4/2022    EGD POLYP SNARE performed by Erica Moe MD at [facility] N/A 2022    ENDOSCOPIC ULTRASOUND OF UPPER GASTROINTESTINAL TRACT performed by Eriberto Fernandez MD at [facility]  2022    EGD BIOPSY performed by Eriberto Fernandez MD at [facility] Social History:    Social History     Tobacco Use    Smoking status: Never    Smokeless tobacco: Never   Substance Use Topics    Alcohol use: Yes     Comment: socially                                Counseling given: Not Answered      Vital Signs (Current):   Vitals:    08/12/22 1047   Weight: 300 lb (136.1 kg)   Height: 6' 5\" (1.956 m)                                              BP Readings from Last 3 Encounters:   05/20/22 108/68   03/04/22 109/77   03/04/22 116/78       NPO Status:                                                                                 BMI:   Wt Readings from Last 3 Encounters:   08/12/22 300 lb (136.1 kg)   05/12/22 300 lb (136.1 kg)   03/04/22 300 lb (136.1 kg)     Body mass index is 35.57 kg/m². CBC: No results found for: WBC, RBC, HGB, HCT, MCV, RDW, PLT    CMP: No results found for: NA, K, CL, CO2, BUN, CREATININE, GFRAA, AGRATIO, LABGLOM, GLUCOSE, GLU, PROT, CALCIUM, BILITOT, ALKPHOS, AST, ALT    POC Tests: No results for input(s): POCGLU, POCNA, POCK, POCCL, POCBUN, POCHEMO, POCHCT in the last 72 hours.     Coags: No results found for: PROTIME, INR, APTT    HCG (If Applicable): No results found for: PREGTESTUR, PREGSERUM, HCG, HCGQUANT     ABGs: No results found for: PHART, PO2ART, FAB2JJJ, MRP8XTP, BEART, [id number]     Type & Screen (If Applicable):  No results found for: LABABO, LABRH    Drug/Infectious Status (If Applicable):  No results found for: HIV, HEPCAB    COVID-19 Screening (If Applicable):   Lab Results   Component Value Date/Time    COVID19 Not Detected 02/28/2022 02:39 PM           Anesthesia Evaluation  Patient summary reviewed no history of anesthetic complications:   Airway: Mallampati: III  TM distance: >3 FB   Neck ROM: full  Mouth opening: > = 3 FB   Dental: normal exam         Pulmonary:normal exam  breath sounds clear to auscultation      (-) COPD, asthma and sleep apnea                           Cardiovascular:  Exercise tolerance: good (>4 METS), (-) hypertension, CAD,  angina and  GAMINO      Rhythm: regular  Rate: normal                    Neuro/Psych:   (+) psychiatric history:   (-) seizures and TIA           GI/Hepatic/Renal:   (+) PUD,      (-) GERD, liver disease and no renal disease       Endo/Other:        (-) diabetes mellitus               Abdominal:             Vascular: Other Findings:           Anesthesia Plan      MAC     ASA 2     (I discussed with the patient the risks and benefits of PIV, anesthesia, IV Narcotics, PACU. All questions were answered the patient agrees with the plan and wishes to proceed)  Induction: intravenous.                             Antonio Tay MD   8/19/2022

## 2022-08-19 NOTE — H&P
[facility]   Pre-operative History and Physical    Patient: Perico Breen  : 1995  Acct#:     HISTORY OF PRESENT ILLNESS:    The patient is a 32 y.o. male who presents for hx of attenuated FAP. Numerous polyps in the past. Last colonoscopy with over 20 polyps removed. Had EGD and EUS with side viewer in May. Has ampullary adenoma with plan to repeat in one year with Dr. Denny Alcantar    Indications: hx of attenuated FAP    Past Medical History:        Diagnosis Date    Attenuated familial adenomatous polyposis     Mood disorder ([address]     PUD (peptic ulcer disease)       Past Surgical History:        Procedure Laterality Date    COLONOSCOPY  2021    UPPER GASTROINTESTINAL ENDOSCOPY      UPPER GASTROINTESTINAL ENDOSCOPY N/A 3/4/2022    EGD BIOPSY performed by Nikos Gotti MD at [address] [place] [address]  3/4/2022    EGD POLYP SNARE performed by Nikos Gotti MD at [address] [place] [address] 2022    ENDOSCOPIC ULTRASOUND OF UPPER GASTROINTESTINAL TRACT performed by Alesia Mckeon MD at [place] [address]  2022    EGD BIOPSY performed by Alesia Mckeon MD at [address] [place] [address]      Medications Prior to Admission:   No current facility-administered medications on file prior to encounter. Current Outpatient Medications on File Prior to Encounter   Medication Sig Dispense Refill    omeprazole (PRILOSEC) 20 MG delayed release capsule Take 40 mg by mouth daily      Multiple Vitamins-Minerals (THERAPEUTIC MULTIVITAMIN-MINERALS) tablet Take 1 tablet by mouth daily      folic acid (FOLVITE) 091 MCG tablet Take 400 mcg by mouth daily          Allergies:  Patient has no known allergies.     Social History:   Social History     Socioeconomic History    Marital status:      Spouse name: Not on file    Number of children: Not on file    Years of education: Not on file    Highest education level: Not on file   Occupational History    Not on file   Tobacco Use    Smoking status: Never    Smokeless tobacco: Never   Vaping Use    Vaping Use: Never used   Substance and Sexual Activity    Alcohol use: Yes     Comment: socially    Drug use: Never    Sexual activity: Yes   Other Topics Concern    Not on file   Social History Narrative    Not on file     Social Determinants of Health     Financial Resource Strain: Not on file   Food Insecurity: Not on file   Transportation Needs: Not on file   Physical Activity: Not on file   Stress: Not on file   Social Connections: Not on file   Intimate Partner Violence: Not on file   Housing Stability: Not on file      Family History:       Problem Relation Age of Onset    Mental Illness Mother     Heart Disease Father     Other Father         attenuated familial adenomatous polyposis        PHYSICAL EXAM:      /88   Pulse 78   Temp 96.8 °F (36 °C) (Temporal)   Resp 18   Ht 6' 5\" (1.956 m)   Wt 300 lb (136.1 kg)   SpO2 95%   BMI 35.57 kg/m²  I        Heart:  Normal apical impulse, regular rate and rhythm, normal S1 and S2, no S3 or S4, and no murmur noted    Lungs:  No increased work of breathing, good air exchange, clear to auscultation bilaterally, no crackles or wheezing    Abdomen:  No scars, normal bowel sounds, soft, non-distended, non-tender, no masses palpated, no hepatosplenomegally      ASA Class  ASA 3 - Patient with moderate systemic disease with functional limitations    Mallampati Class: 3      ASSESSMENT AND PLAN:    1. Patient is a suitable candidate for endoscopic procedure and attendant anesthesia  2. Risks, benefits, alternatives of procedure discussed in detail with patient including risks of bleeding, infection, perforation, risks of sedation, risks of missed lesions. The patient wishes to proceed.

## 2023-07-12 NOTE — PROGRESS NOTES
Patient ___ reached  __X___not reached-preop instructions left on voice [phone]____________      DATE_7/21/2023_ TIME_0905__ [id number]________        Location: [address] [place] [address] [address]       Nothing to eat or drink after midnight the night before,except for what the prep instructions call for. If you do not have the instructions or do not understand them please contact your doctors office. Follow any instructions your doctors office has given you including what medications to take the AM of your procedure and which ones to hold. You may use your inhalers - bring rescue inhalers with you DOS. If you take a long acting insulin the bhupendra prior please cut the dose in half and take no diabetic medications that AM.Follow specific doctors office instructions regarding blood thinners and if they want you to hold and for how long. If you are on a blood thinner and have no instructions please contact the office and ask. Dress comfortably,bring your insurance card,picture ID,and a complete list of medications, including supplements. You must have a responsible adult to stay with you during the procedure,drive you home and stay with you. [facility] phone number [phone] for any questions. OTHER INSTRUCTIONS(if applicable)_________________________________________________________        VISITOR POLICY(subject to change)    Current visitor policy is 2 visitors per patient. No children allowed. Mask at discretion of facility. Visiting hours are 8a-8p. Overnight visitors will be at the discretion of the nurse. All policies are subject to change.

## 2023-07-21 ENCOUNTER — HOSPITAL ENCOUNTER (OUTPATIENT)
Age: 28
Setting detail: OUTPATIENT SURGERY
Discharge: HOME HEALTH | End: 2023-07-21
Attending: INTERNAL MEDICINE | Admitting: INTERNAL MEDICINE
Payer: COMMERCIAL

## 2023-07-21 ENCOUNTER — ANESTHESIA EVENT (OUTPATIENT)
Dept: ENDOSCOPY | Age: 28
End: 2023-07-21
Payer: COMMERCIAL

## 2023-07-21 ENCOUNTER — ANESTHESIA (OUTPATIENT)
Dept: ENDOSCOPY | Age: 28
End: 2023-07-21
Payer: COMMERCIAL

## 2023-07-21 VITALS
HEIGHT: 77 IN | OXYGEN SATURATION: 96 % | DIASTOLIC BLOOD PRESSURE: 79 MMHG | WEIGHT: 298 LBS | BODY MASS INDEX: 35.19 KG/M2 | HEART RATE: 73 BPM | RESPIRATION RATE: 18 BRPM | SYSTOLIC BLOOD PRESSURE: 121 MMHG | TEMPERATURE: 97.2 F

## 2023-07-21 PROCEDURE — 3700000000 HC ANESTHESIA ATTENDED CARE: Performed by: INTERNAL MEDICINE

## 2023-07-21 PROCEDURE — 2580000003 HC RX 258: Performed by: ANESTHESIOLOGY

## 2023-07-21 PROCEDURE — 2709999900 HC NON-CHARGEABLE SUPPLY: Performed by: INTERNAL MEDICINE

## 2023-07-21 PROCEDURE — 2500000003 HC RX 250 WO HCPCS: Performed by: NURSE ANESTHETIST, CERTIFIED REGISTERED

## 2023-07-21 PROCEDURE — 7100000010 HC PHASE II RECOVERY - FIRST 15 MIN: Performed by: INTERNAL MEDICINE

## 2023-07-21 PROCEDURE — 3609155700 HC EGD STENT REMOVAL: Performed by: INTERNAL MEDICINE

## 2023-07-21 PROCEDURE — 6360000002 HC RX W HCPCS: Performed by: NURSE ANESTHETIST, CERTIFIED REGISTERED

## 2023-07-21 PROCEDURE — 7100000011 HC PHASE II RECOVERY - ADDTL 15 MIN: Performed by: INTERNAL MEDICINE

## 2023-07-21 RX ORDER — PROPOFOL 10 MG/ML
INJECTION, EMULSION INTRAVENOUS PRN
Status: DISCONTINUED | OUTPATIENT
Start: 2023-07-21 | End: 2023-07-21 | Stop reason: SDUPTHER

## 2023-07-21 RX ORDER — KETAMINE HCL IN NACL, ISO-OSM 100MG/10ML
SYRINGE (ML) INJECTION PRN
Status: DISCONTINUED | OUTPATIENT
Start: 2023-07-21 | End: 2023-07-21 | Stop reason: SDUPTHER

## 2023-07-21 RX ORDER — LIDOCAINE HYDROCHLORIDE 20 MG/ML
INJECTION, SOLUTION INFILTRATION; PERINEURAL PRN
Status: DISCONTINUED | OUTPATIENT
Start: 2023-07-21 | End: 2023-07-21 | Stop reason: SDUPTHER

## 2023-07-21 RX ORDER — SODIUM CHLORIDE 9 MG/ML
INJECTION, SOLUTION INTRAVENOUS CONTINUOUS
Status: DISCONTINUED | OUTPATIENT
Start: 2023-07-21 | End: 2023-07-21 | Stop reason: HOSPADM

## 2023-07-21 RX ADMIN — LIDOCAINE HYDROCHLORIDE 100 MG: 20 INJECTION, SOLUTION INFILTRATION; PERINEURAL at 09:03

## 2023-07-21 RX ADMIN — PROPOFOL 100 MG: 10 INJECTION, EMULSION INTRAVENOUS at 09:03

## 2023-07-21 RX ADMIN — Medication 30 MG: at 09:03

## 2023-07-21 RX ADMIN — SODIUM CHLORIDE: 9 INJECTION, SOLUTION INTRAVENOUS at 08:46

## 2023-07-21 ASSESSMENT — ENCOUNTER SYMPTOMS: SHORTNESS OF BREATH: 0

## 2023-07-21 ASSESSMENT — PAIN SCALES - GENERAL
PAINLEVEL_OUTOF10: 0
PAINLEVEL_OUTOF10: 0

## 2023-07-21 NOTE — OP NOTE
Esophagogastroduodenoscopy Procedure Note      Patient: Evans Mcleod  : 1995  Acct#:     Procedure: Esophagogastroduodenoscopy    Date:  2023    Surgeon:  Mel Wiggins MD    Preoperative Diagnosis:  encounter for removal of biliary and pancreatic stents, stents placed via ERCP with ampullectomy 23    Postoperative Diagnosis:    1) intact removal of biliary and pancreatic stents using duodenoscope  2) regenerative changes at ampulla, no clear residual adenoma    Consent:  The patient or their legal guardian has signed a consent, and is aware of the potential risks, benefits, alternatives, and potential complications of this procedure. These include, but are not limited to hemorrhage, bleeding, post procedural pain, perforation, phlebitis, aspiration, hypotension, hypoxia, cardiovascular events such as arryhthmia, and possibly death. Additionally, the possibility of missed lesions was conveyed to patient. Anesthesia:  MAC    Procedure: An informed consent was obtained from the patient after explanation of indications, benefits, possible risks and complications of the procedure. The patient was then taken to the endoscopy suite, placed in the left lateral position, and the above IV anesthesia was administered. The Olympus duodenoscope was placed in the patient's mouth and advanced under direct vision through the esophagus and stomach to the second portion of the duodenum. Limited view of the stomach showed multiple fundic gland polyps. In the descending duodenum two stents were protruding, a straight plastic biliary stent and a double pigtail pancreatic stent. Both were removed intact with a snare. The ampulla was then carefully examined after irrigation. There was regenerative appearing ampullary mucosa. No clear residual adenoma was seen. The patient was decompressed as the scope was withdrawn. All instruments were removed.  The patient tolerated the procedure well and was taken to the PACU in good condition.       Estimated Blood Loss (mL): minimal (< 50 mL)    Complications: None    Impression:     1) intact removal of biliary and pancreatic stents using duodenoscope  2) regenerative changes at ampulla, no clear residual adenoma    Recommendations:    Repeat EGD in one year with straight and sideviewing exam.    Cheyenne Bradley MD  [facility]  7/21/2023  [phone]

## 2023-07-21 NOTE — PROGRESS NOTES
Teaching / education initiated regarding perioperative experience, expectations, and pain management during stay. Patient verbalized understanding. Wife at bedside.

## 2023-07-21 NOTE — DISCHARGE INSTRUCTIONS
Impression:     1) intact removal of biliary and pancreatic stents using duodenoscope  2) regenerative changes at ampulla, no clear residual adenoma    Recommendations:    Repeat EGD in one year with straight and sideviewing exam.    EGD DISCHARGE INSTRUCTIONS    Use salt water gargle, lozenges, or Chloraseptic spray as needed for sore throat. If you have fever, chills, excessive bleeding, severe chest pain, or abdominal pain, or any other problems, contact your physician's office immediately at [phone]. If you had an esophageal dilatation, and experience fever, chills, excessive bleeding, shortness of breath, chest or abdominal pain, or any other unusual symptom, call the office immediately at [phone]. Continue home medications as directed. Call physician's office in five to seven business days for biopsy results or further instructions. Call your physician at [phone] for a follow up appointment in ______________    You need another EGD in _______________________________    See your physician's report for procedure details and recommendations. ANESTHESIA DISCHARGE INSTRUCTIONS    Wear your seatbelt home. A responsible adult needs to be with you for 24 hours  You are under the influence of drugs-do not drink alcohol, drive ,operate machinery,or make any important decisions or sign any legal documentsfor 24 hours. You may resume normal activities tomorrow. You may experience lightheadedness,dizziness,or sleepiness following surgery. Rest at home today - increase activity as tolerated. It is recommended to take a four hour nap after procedure. Progress slowly to a regular diet unless your physician has instructed you otherwise. Avoid spicy and greasy food on first meal.  Drink plenty of water. If nausea becomes a problem call your physician. Call your doctor if concerns arise.

## 2023-07-21 NOTE — ANESTHESIA POSTPROCEDURE EVALUATION
Department of Anesthesiology  Postprocedure Note    Patient: Evans Mcleod  MRN: [id number]  YOB: 1995  Date of evaluation: 7/21/2023      Procedure Summary     Date: 07/21/23 Room / Location: [address] [place] [address]    Anesthesia Start: 0900 Anesthesia Stop: 0948    Procedure: EGD STENT REMOVAL x2 Diagnosis:       Ampullary adenoma      Epigastric pain      Familial adenomatous polyposis      (Ampullary adenoma [D13.5])      (Epigastric pain [R10.13])      (Familial adenomatous polyposis [D12.6])    Surgeons: Abelino Paulino MD Responsible Provider: Teena Hardin MD    Anesthesia Type: MAC ASA Status: 2          Anesthesia Type: No value filed. Valerio Phase I: Valerio Score: 10    Valerio Phase II: Valerio Score: 9      Anesthesia Post Evaluation    Patient location during evaluation: PACU  Level of consciousness: awake  Airway patency: patent  Complications: no  Cardiovascular status: hemodynamically stable  Respiratory status: acceptable  There was medical reason for not using a multimodal analgesia pain management approach.

## 2025-01-23 NOTE — PROGRESS NOTES
Juan Antonio Mihir    Age 29 y.o.    male    1995    MRN [id number]    1/31/2025  Arrival Time_____________  OR Time____________45 Min     Procedure(s):  COLONOSCOPY  ESOPHAGOGASTRODUODENOSCOPY                      Monitor Anesthesia Care    Surgeon(s):  Bobby Cortez N, MD       Phone [phone] (home)     Initals  Date  Info Source  Home  Cell         Work  _____________________________________________________________________  _____________________________________________________________________  _____________________________________________________________________  _____________________________________________________________________  _____________________________________________________________________    PCP _____________________________ Phone_________________     H&P  ________________  Bringing      Chart              Epic      DOS      Called________  EKG ________________   Bringing      Chart              Epic      DOS      Called________  LABS________________   Bringing     Chart              Epic      DOS      Called________  Cardiac Clearance ______ Bringing      Chart              Epic      DOS      Called________  Pulmonary Clearance____ Bringing      Chart              Epic      DOS      Called________    Cardiologist________________________ Phone___________________________  Pulmonologist_______________________Phone___________________________    ? Advance Directives   ? Judaism concerns / Waiver on Chart            [employer]________________  ? Pre-op Instructions Given /Understood          _________________________________  ? Directions to Surgery Center                          _________________________________  ? Transportation Home_______________      __________________________________  ? Crutches/Walker__________________        __________________________________    Orders: Hard copy/ EPIC                 Transcribed/ EPIC              _______Wt.    ________Pharmacy

## 2025-01-24 RX ORDER — OMEPRAZOLE 40 MG/1
40 CAPSULE, DELAYED RELEASE ORAL DAILY
COMMUNITY

## 2025-01-24 NOTE — PROGRESS NOTES
Date and time of surgery : 1/31/25 at 0830             Arrival Time: 0730      Bring Picture ID and insurance card.  Please wear simple, loose fitting clothing to the hospital.   Do not bring valuables (money, credit cards, checkbooks, etc.)   DO NOT wear any jewelry or piercings on day of surgery.  All body piercing jewelry must be removed.  If you have dentures, they will be removed before going to the OR; we will provide you a container.  If you wear contact lenses or glasses, they will be removed; please bring a case for them.  Shower the evening before or morning of surgery with antibacterial soap.  Nothing to eat or drink after 330 am the morning of your procedure  You may brush your teeth and gargle the morning of surgery.  DO NOT SWALLOW WATER.   The morning of your procedure take only Prilosec with a sip of water.  Aspirin, Ibuprofen, Advil, Naproxen, Vitamin E and other Anti-inflammatory products and supplements should be stopped for 5 -7days before surgery or as directed by your physician.  Do not smoke or drink any alcoholic beverages 24 hours prior to surgery.  This includes NA Beer. Refrain from the usage of any recreational drugs, including non-prescribed prescription drugs.   You MUST plan for a responsible adult to stay on site while you are here and take you home after your surgery. You will not be allowed to leave alone or drive yourself home. It is strongly suggested someone stay with you the first 24 hrs. Your surgery will be cancelled if you do not have a ride home.  To help prevent infection, change your sheets the night before surgery.   If you  have a Living Will and Durable Power of  for Healthcare, please bring in a copy.  Notify your Surgeon if you develop any illness between now and time of surgery. Cough, cold, fever, sore throat, nausea, vomiting, etc.  Please notify your surgeon if you experience dizziness, shortness of breath or blurred vision between now & the time of your  surgery  To provide excellent care visitors will be limited to two per room at any given time. No visitors under the age of 14.  If you use oxygen and have a portable tank please bring it with you the DOS  For your convenience [facility] has a pharmacy on site to fill your prescriptions.     *Please call pre admission testing if you have any further questions             John      [phone]                            Address: [address] [place] [address]     When you pull into the hospital and are looking at the main hospital entrance, turn right.   We are a tan building to the right of the main entrance.   [id number] over the door.  .                                                        Revision History

## 2025-01-31 ENCOUNTER — ANESTHESIA EVENT (OUTPATIENT)
Dept: ENDOSCOPY | Age: 30
End: 2025-01-31
Payer: COMMERCIAL

## 2025-01-31 ENCOUNTER — ANESTHESIA (OUTPATIENT)
Dept: ENDOSCOPY | Age: 30
End: 2025-01-31
Payer: COMMERCIAL

## 2025-01-31 ENCOUNTER — HOSPITAL ENCOUNTER (OUTPATIENT)
Age: 30
Setting detail: OUTPATIENT SURGERY
Discharge: HOME | End: 2025-01-31
Attending: INTERNAL MEDICINE | Admitting: INTERNAL MEDICINE
Payer: COMMERCIAL

## 2025-01-31 VITALS
HEART RATE: 89 BPM | HEIGHT: 77 IN | OXYGEN SATURATION: 96 % | TEMPERATURE: 98.3 F | DIASTOLIC BLOOD PRESSURE: 79 MMHG | SYSTOLIC BLOOD PRESSURE: 116 MMHG | RESPIRATION RATE: 18 BRPM | WEIGHT: 280 LBS | BODY MASS INDEX: 33.06 KG/M2

## 2025-01-31 DIAGNOSIS — Z86.0100 HX OF COLONIC POLYPS: ICD-10-CM

## 2025-01-31 DIAGNOSIS — D12.6 ADENOMA OF COLON: ICD-10-CM

## 2025-01-31 DIAGNOSIS — K31.7 GASTRIC POLYPS: ICD-10-CM

## 2025-01-31 PROCEDURE — 3609010500 HC COLONOSCOPY POLYPECTOMY REMOVAL HOT BIOPSY/STOMA: Performed by: INTERNAL MEDICINE

## 2025-01-31 PROCEDURE — 7100000011 HC PHASE II RECOVERY - ADDTL 15 MIN: Performed by: INTERNAL MEDICINE

## 2025-01-31 PROCEDURE — 2580000003 HC RX 258: Performed by: ANESTHESIOLOGY

## 2025-01-31 PROCEDURE — C1889 IMPLANT/INSERT DEVICE, NOC: HCPCS | Performed by: INTERNAL MEDICINE

## 2025-01-31 PROCEDURE — 6360000002 HC RX W HCPCS

## 2025-01-31 PROCEDURE — 3609012400 HC EGD TRANSORAL BIOPSY SINGLE/MULTIPLE: Performed by: INTERNAL MEDICINE

## 2025-01-31 PROCEDURE — 3700000000 HC ANESTHESIA ATTENDED CARE: Performed by: INTERNAL MEDICINE

## 2025-01-31 PROCEDURE — 7100000010 HC PHASE II RECOVERY - FIRST 15 MIN: Performed by: INTERNAL MEDICINE

## 2025-01-31 PROCEDURE — 3609009900 HC COLONOSCOPY W/CONTROL BLEEDING ANY METHOD: Performed by: INTERNAL MEDICINE

## 2025-01-31 PROCEDURE — 3700000001 HC ADD 15 MINUTES (ANESTHESIA): Performed by: INTERNAL MEDICINE

## 2025-01-31 PROCEDURE — 3609010600 HC COLONOSCOPY POLYPECTOMY SNARE/COLD BIOPSY: Performed by: INTERNAL MEDICINE

## 2025-01-31 PROCEDURE — 88305 TISSUE EXAM BY PATHOLOGIST: CPT

## 2025-01-31 PROCEDURE — 2709999900 HC NON-CHARGEABLE SUPPLY: Performed by: INTERNAL MEDICINE

## 2025-01-31 DEVICE — CLIP
Type: IMPLANTABLE DEVICE | Status: FUNCTIONAL
Brand: RESOLUTION 360™ ULTRA CLIP

## 2025-01-31 RX ORDER — SODIUM CHLORIDE 0.9 % (FLUSH) 0.9 %
5-40 SYRINGE (ML) INJECTION EVERY 12 HOURS SCHEDULED
Status: DISCONTINUED | OUTPATIENT
Start: 2025-01-31 | End: 2025-01-31 | Stop reason: HOSPADM

## 2025-01-31 RX ORDER — MIDAZOLAM HYDROCHLORIDE 1 MG/ML
2 INJECTION, SOLUTION INTRAMUSCULAR; INTRAVENOUS
Status: DISCONTINUED | OUTPATIENT
Start: 2025-01-31 | End: 2025-01-31 | Stop reason: HOSPADM

## 2025-01-31 RX ORDER — GLYCOPYRROLATE 0.2 MG/ML
INJECTION INTRAMUSCULAR; INTRAVENOUS
Status: DISCONTINUED | OUTPATIENT
Start: 2025-01-31 | End: 2025-01-31 | Stop reason: SDUPTHER

## 2025-01-31 RX ORDER — LIDOCAINE HYDROCHLORIDE 20 MG/ML
INJECTION, SOLUTION EPIDURAL; INFILTRATION; INTRACAUDAL; PERINEURAL
Status: DISCONTINUED | OUTPATIENT
Start: 2025-01-31 | End: 2025-01-31 | Stop reason: SDUPTHER

## 2025-01-31 RX ORDER — SODIUM CHLORIDE, SODIUM LACTATE, POTASSIUM CHLORIDE, CALCIUM CHLORIDE 600; 310; 30; 20 MG/100ML; MG/100ML; MG/100ML; MG/100ML
INJECTION, SOLUTION INTRAVENOUS CONTINUOUS
Status: DISCONTINUED | OUTPATIENT
Start: 2025-01-31 | End: 2025-01-31 | Stop reason: HOSPADM

## 2025-01-31 RX ORDER — PROPOFOL 10 MG/ML
INJECTION, EMULSION INTRAVENOUS
Status: DISCONTINUED | OUTPATIENT
Start: 2025-01-31 | End: 2025-01-31 | Stop reason: SDUPTHER

## 2025-01-31 RX ORDER — SODIUM CHLORIDE 9 MG/ML
INJECTION, SOLUTION INTRAVENOUS PRN
Status: DISCONTINUED | OUTPATIENT
Start: 2025-01-31 | End: 2025-01-31 | Stop reason: HOSPADM

## 2025-01-31 RX ORDER — SODIUM CHLORIDE 0.9 % (FLUSH) 0.9 %
5-40 SYRINGE (ML) INJECTION PRN
Status: DISCONTINUED | OUTPATIENT
Start: 2025-01-31 | End: 2025-01-31 | Stop reason: HOSPADM

## 2025-01-31 RX ADMIN — SODIUM CHLORIDE 1000 ML: 9 INJECTION, SOLUTION INTRAVENOUS at 08:09

## 2025-01-31 RX ADMIN — PROPOFOL 100 MCG/KG/MIN: 10 INJECTION, EMULSION INTRAVENOUS at 08:37

## 2025-01-31 RX ADMIN — SODIUM CHLORIDE: 9 INJECTION, SOLUTION INTRAVENOUS at 08:36

## 2025-01-31 RX ADMIN — GLYCOPYRROLATE 0.2 MG: 0.2 INJECTION, SOLUTION INTRAMUSCULAR; INTRAVENOUS at 08:36

## 2025-01-31 RX ADMIN — LIDOCAINE HYDROCHLORIDE 50 MG: 20 INJECTION, SOLUTION EPIDURAL; INFILTRATION; INTRACAUDAL; PERINEURAL at 08:36

## 2025-01-31 RX ADMIN — PROPOFOL 100 MG: 10 INJECTION, EMULSION INTRAVENOUS at 08:36

## 2025-01-31 ASSESSMENT — PAIN SCALES - GENERAL
PAINLEVEL_OUTOF10: 0

## 2025-01-31 ASSESSMENT — PAIN - FUNCTIONAL ASSESSMENT: PAIN_FUNCTIONAL_ASSESSMENT: 0-10

## 2025-01-31 NOTE — H&P
[facility]   Pre-operative History and Physical    Patient: Juan Antonio Ash  : 1995  Acct#:     HISTORY OF PRESENT ILLNESS:    The patient is a 29 y.o. male who presents for attenuated FAP.  Hx of ampullectomy and ERCP with Dr. Whitman in 2023      Indications: attenuated FAP    Past Medical History:        Diagnosis Date    Ampullary adenoma     removed 2023    Attenuated familial adenomatous polyposis     Familial adenomatous polyposis     Mood disorder (HCC)     PUD (peptic ulcer disease)       Past Surgical History:        Procedure Laterality Date    COLONOSCOPY  2021    COLONOSCOPY N/A 2022    COLONOSCOPY performed by Bobby Cortez MD at [facility] ENDOSCOPY    UPPER GASTROINTESTINAL ENDOSCOPY      UPPER GASTROINTESTINAL ENDOSCOPY N/A 3/4/2022    EGD BIOPSY performed by Bobby Cortez MD at [facility] ENDOSCOPY    UPPER GASTROINTESTINAL ENDOSCOPY  3/4/2022    EGD POLYP SNARE performed by Bobby Cortez MD at [facility] ENDOSCOPY    UPPER GASTROINTESTINAL ENDOSCOPY N/A 2022    ENDOSCOPIC ULTRASOUND OF UPPER GASTROINTESTINAL TRACT performed by Arthur Whitman MD at [facility] ENDOSCOPY    UPPER GASTROINTESTINAL ENDOSCOPY  2022    EGD BIOPSY performed by Arthur Whitman MD at [facility] ENDOSCOPY    UPPER GASTROINTESTINAL ENDOSCOPY N/A 2023    EGD STENT REMOVAL x2 performed by Arthur Whitman MD at [facility] ENDOSCOPY      Medications Prior to Admission:   No current facility-administered medications on file prior to encounter.     Current Outpatient Medications on File Prior to Encounter   Medication Sig Dispense Refill    omeprazole (PRILOSEC) 40 MG delayed release capsule Take 1 capsule by mouth daily      Multiple Vitamins-Minerals (THERAPEUTIC MULTIVITAMIN-MINERALS) tablet Take 1 tablet by mouth daily      folic acid (FOLVITE) 400 MCG tablet Take 1 tablet by mouth daily          Allergies:  Patient has no known allergies.    Social History:   Social History

## 2025-01-31 NOTE — ANESTHESIA POSTPROCEDURE EVALUATION
Department of Anesthesiology  Postprocedure Note    Patient: Juan Antonio Ash  MRN: [id number]  YOB: 1995  Date of evaluation: 1/31/2025    Procedure Summary       Date: 01/31/25 Room / Location: [facility] ENDO  / [facility]    Anesthesia Start: 0831 Anesthesia Stop: 0929    Procedures:       COLONOSCOPY POLYPECTOMY SNARE/BIOPSY      ESOPHAGOGASTRODUODENOSCOPY BIOPSY      COLONOSCOPY POLYPECTOMY REMOVAL HOT BIOPSY      COLONOSCOPY CONTROL HEMORRHAGE Diagnosis:       Adenoma of colon      Gastric polyps      Hx of colonic polyps      (Adenoma of colon [D12.6])      (Gastric polyps [K31.7])      (Hx of colonic polyps [Z86.0100])    Surgeons: Bobby Cortez MD Responsible Provider: Don Perez MD    Anesthesia Type: MAC ASA Status: 2            Anesthesia Type: No value filed.    Valerio Phase I: Valerio Score: 10    Valerio Phase II: Valerio Score: 9    Anesthesia Post Evaluation    Comments: Anes Post-op Note    Name:    Juan Antonio Ash  MRN:      [id number]    Patient Vitals in the past 12 hrs:  01/31/25 1000, BP:116/79, Pulse:89, Resp:18, SpO2:96 %  01/31/25 0950, BP:103/74, Pulse:88, Resp:18, SpO2:97 %  01/31/25 0940, BP:120/79, Pulse:(!) 103, Resp:18, SpO2:94 %  01/31/25 0932, BP:104/82, Pulse:(!) 108, Resp:18, SpO2:93 %  01/31/25 0756, BP:130/88, Temp:98.3 °F (36.8 °C), Temp src:Oral, Pulse:74, Resp:18, SpO2:93 %     LABS:    CBC  No results found for: \"WBC\", \"HGB\", \"HCT\", \"PLT\"  RENAL  No results found for: \"NA\", \"K\", \"CL\", \"CO2\", \"BUN\", \"CREATININE\", \"GLUCOSE\"  COAGS  No results found for: \"PROTIME\", \"INR\", \"APTT\"    Intake & Output:  In: 600 [I.V.:600]  Out: -     Nausea & Vomiting:  No    Level of Consciousness:  Awake    Pain Assessment:  Adequate analgesia    Anesthesia Complications:  No apparent anesthetic complications    SUMMARY      Vital signs stable  OK to discharge from Stage I post anesthesia care.  Care transferred from Anesthesiology department on discharge from

## 2025-01-31 NOTE — ANESTHESIA PRE PROCEDURE
Department of Anesthesiology  Preprocedure Note       Name:  Juan Antonio Ash   Age:  29 y.o.  :  1995                                          MRN:  [id number]         Date:  2025      Surgeon: Surgeon(s):  Bobby Cortez MD    Procedure: Procedure(s):  COLONOSCOPY  ESOPHAGOGASTRODUODENOSCOPY    Medications prior to admission:   Prior to Admission medications    Medication Sig Start Date End Date Taking? Authorizing Provider   omeprazole (PRILOSEC) 40 MG delayed release capsule Take 1 capsule by mouth daily   Yes ProviderLary MD   Multiple Vitamins-Minerals (THERAPEUTIC MULTIVITAMIN-MINERALS) tablet Take 1 tablet by mouth daily    Lary Castillo MD   folic acid (FOLVITE) 400 MCG tablet Take 1 tablet by mouth daily    ProviderLary MD       Current medications:    Current Facility-Administered Medications   Medication Dose Route Frequency Provider Last Rate Last Admin   • lactated ringers infusion   IntraVENous Continuous Jose Dubose MD       • sodium chloride flush 0.9 % injection 5-40 mL  5-40 mL IntraVENous 2 times per day Jose Dubose MD       • sodium chloride flush 0.9 % injection 5-40 mL  5-40 mL IntraVENous PRN Jose Dubose MD       • 0.9 % sodium chloride infusion   IntraVENous PRN Jose Dubose MD       • midazolam (VERSED) injection 2 mg  2 mg IntraVENous Once PRN Jose Dubose MD           Allergies:  No Known Allergies    Problem List:  There is no problem list on file for this patient.      Past Medical History:        Diagnosis Date   • Ampullary adenoma     removed 2023   • Attenuated familial adenomatous polyposis    • Familial adenomatous polyposis    • Mood disorder (HCC)    • PUD (peptic ulcer disease)        Past Surgical History:        Procedure Laterality Date   • COLONOSCOPY  2021   • COLONOSCOPY N/A 2022    COLONOSCOPY performed by Bobby Cortez MD at [facility] ENDOSCOPY   • UPPER GASTROINTESTINAL ENDOSCOPY     • UPPER

## (undated) DEVICE — ENDOSCOPIC KIT 2 12 FT OP4 DE2 GWN SYR

## (undated) DEVICE — ELECTRODE ECG MONITR FOAM TEAR DROP ADLT RED

## (undated) DEVICE — SNARES COLD OVAL 10MM THIN

## (undated) DEVICE — Device

## (undated) DEVICE — FORCEPS BX L240CM JAW DIA2.8MM L CAP W/ NDL MIC MESH TOOTH

## (undated) DEVICE — CANNULA NSL AD L7FT CLR VYN CRV PRNG NONFLARED TIP STD CONN

## (undated) DEVICE — ENDOSCOPY KIT: Brand: MEDLINE INDUSTRIES, INC.

## (undated) DEVICE — CONTAINER SPEC 480ML CLR POLYSTYR 10% NEUT BUFF FRMLN ZN

## (undated) DEVICE — SNARE ENDOSCP L240CM SHTH DIA24MM LOOP W10MM POLYP RND REINF

## (undated) DEVICE — BITE BLOCK ENDOSCP AD 60 FR W/ ADJ STRP PLAS GRN BLOX

## (undated) DEVICE — SOLUTION IV IRRIG WATER 500ML POUR BRL ST 2F7113

## (undated) DEVICE — BW-412T DISP COMBO CLEANING BRUSH: Brand: SINGLE USE COMBINATION CLEANING BRUSH

## (undated) DEVICE — VALVE SUCTION AIR H2O SET ORCA POD + DISP

## (undated) DEVICE — CONMED SCOPE SAVER BITE BLOCK, 20X27 MM: Brand: SCOPE SAVER

## (undated) DEVICE — ENDOSCOPIC KIT 6X3/16 FT COLON W/ 1.1 OZ 2 GWN W/O BRSH

## (undated) DEVICE — TRAP SPEC POLYPR SGL CHMBR FN MESH SCRN

## (undated) DEVICE — MASK CAPNOGRAPHY AD W35IN DIA58IN SAMP LN L10FT O2 LN

## (undated) DEVICE — FORCEPS BX 240CM 2.4MM L NDL RAD JAW 4 M00513334

## (undated) DEVICE — MOUTHPIECE ENDOSCP L CTRL OPN AND SIDE PORTS DISP

## (undated) DEVICE — SNARE ENDOSCP AD L240CM LOOP W10MM SHTH DIA2.4MM RND INSUL

## (undated) DEVICE — AIR/WATER CLEANING ADAPTER FOR OLYMPUS® GI ENDOSCOPE: Brand: BULLDOG®

## (undated) DEVICE — AIRLIFE™ NASAL OXYGEN CANNULA CURVED, FLARED TIP, WITH 7 FEET (2.1 M) CRUSH RESISTANT TUBING, OVER-THE-EAR STYLE: Brand: AIRLIFE™